# Patient Record
Sex: MALE | Race: WHITE | NOT HISPANIC OR LATINO | URBAN - METROPOLITAN AREA
[De-identification: names, ages, dates, MRNs, and addresses within clinical notes are randomized per-mention and may not be internally consistent; named-entity substitution may affect disease eponyms.]

---

## 2017-05-28 ENCOUNTER — INPATIENT (INPATIENT)
Facility: HOSPITAL | Age: 47
LOS: 0 days | Discharge: HOME | End: 2017-05-29
Payer: COMMERCIAL

## 2017-05-28 PROCEDURE — 72170 X-RAY EXAM OF PELVIS: CPT | Mod: 26

## 2017-05-28 PROCEDURE — 71010: CPT | Mod: 26

## 2017-05-28 PROCEDURE — 70496 CT ANGIOGRAPHY HEAD: CPT | Mod: 26

## 2017-05-28 PROCEDURE — 99285 EMERGENCY DEPT VISIT HI MDM: CPT

## 2017-05-28 PROCEDURE — 70498 CT ANGIOGRAPHY NECK: CPT | Mod: 26

## 2017-09-29 ENCOUNTER — OUTPATIENT (OUTPATIENT)
Dept: OUTPATIENT SERVICES | Facility: HOSPITAL | Age: 47
LOS: 1 days | End: 2017-09-29
Payer: COMMERCIAL

## 2017-09-29 PROCEDURE — 76700 US EXAM ABDOM COMPLETE: CPT | Mod: 26

## 2017-12-02 ENCOUNTER — EMERGENCY (EMERGENCY)
Facility: HOSPITAL | Age: 47
LOS: 1 days | End: 2017-12-02
Payer: COMMERCIAL

## 2017-12-02 PROCEDURE — 99284 EMERGENCY DEPT VISIT MOD MDM: CPT

## 2021-07-01 ENCOUNTER — TRANSCRIPTION ENCOUNTER (OUTPATIENT)
Age: 51
End: 2021-07-01

## 2021-07-02 ENCOUNTER — APPOINTMENT (OUTPATIENT)
Dept: ULTRASOUND IMAGING | Facility: CLINIC | Age: 51
End: 2021-07-02
Payer: COMMERCIAL

## 2021-07-02 PROCEDURE — 76870 US EXAM SCROTUM: CPT

## 2021-07-16 ENCOUNTER — APPOINTMENT (OUTPATIENT)
Dept: ULTRASOUND IMAGING | Facility: CLINIC | Age: 51
End: 2021-07-16
Payer: COMMERCIAL

## 2021-07-16 PROCEDURE — 93880 EXTRACRANIAL BILAT STUDY: CPT

## 2021-07-17 ENCOUNTER — TRANSCRIPTION ENCOUNTER (OUTPATIENT)
Age: 51
End: 2021-07-17

## 2021-08-04 PROBLEM — Z00.00 ENCOUNTER FOR PREVENTIVE HEALTH EXAMINATION: Status: ACTIVE | Noted: 2021-08-04

## 2021-08-05 ENCOUNTER — APPOINTMENT (OUTPATIENT)
Dept: ORTHOPEDIC SURGERY | Facility: CLINIC | Age: 51
End: 2021-08-05

## 2021-08-12 ENCOUNTER — NON-APPOINTMENT (OUTPATIENT)
Age: 51
End: 2021-08-12

## 2021-08-12 ENCOUNTER — APPOINTMENT (OUTPATIENT)
Dept: UROLOGY | Facility: CLINIC | Age: 51
End: 2021-08-12
Payer: COMMERCIAL

## 2021-08-12 VITALS
SYSTOLIC BLOOD PRESSURE: 131 MMHG | TEMPERATURE: 97.7 F | DIASTOLIC BLOOD PRESSURE: 87 MMHG | BODY MASS INDEX: 24.44 KG/M2 | WEIGHT: 165 LBS | HEIGHT: 69 IN | HEART RATE: 71 BPM

## 2021-08-12 DIAGNOSIS — Z78.9 OTHER SPECIFIED HEALTH STATUS: ICD-10-CM

## 2021-08-12 DIAGNOSIS — N50.819 TESTICULAR PAIN, UNSPECIFIED: ICD-10-CM

## 2021-08-12 PROCEDURE — 99203 OFFICE O/P NEW LOW 30 MIN: CPT

## 2021-08-12 NOTE — LETTER BODY
[Dear  ___] : Dear  [unfilled], [Consult Letter:] : I had the pleasure of evaluating your patient, [unfilled]. [Please see my note below.] : Please see my note below. [Consult Closing:] : Thank you very much for allowing me to participate in the care of this patient.  If you have any questions, please do not hesitate to contact me. [Sincerely,] : Sincerely, [FreeTextEntry3] : Supa Ho, DO\par Genitourinary Medicine\par

## 2021-08-12 NOTE — HISTORY OF PRESENT ILLNESS
[FreeTextEntry1] : Mr. SARABJIT CRAWFORD 50 year old  M  no PMH and no PSH. Pt comes in bc of left testicular pain since the beginning of July that comes and goes. Pain happens at least once a day. Pt was treated with doxycycline for 1 month. 7/2/21 US shows small bilat hydroceles and a small echogenic focus right testicle\par

## 2021-08-12 NOTE — ASSESSMENT
[FreeTextEntry1] : Plan\par NSAIDs for pain\par scrotal support\par scrotal US 6 months \par fu 6  months

## 2022-02-17 ENCOUNTER — APPOINTMENT (OUTPATIENT)
Dept: UROLOGY | Facility: CLINIC | Age: 52
End: 2022-02-17

## 2023-08-25 ENCOUNTER — NON-APPOINTMENT (OUTPATIENT)
Age: 53
End: 2023-08-25

## 2023-08-25 ENCOUNTER — APPOINTMENT (OUTPATIENT)
Dept: CARDIOLOGY | Facility: CLINIC | Age: 53
End: 2023-08-25
Payer: COMMERCIAL

## 2023-08-25 VITALS
OXYGEN SATURATION: 99 % | HEART RATE: 65 BPM | WEIGHT: 167 LBS | BODY MASS INDEX: 25.31 KG/M2 | HEIGHT: 68 IN | DIASTOLIC BLOOD PRESSURE: 60 MMHG | SYSTOLIC BLOOD PRESSURE: 96 MMHG

## 2023-08-25 VITALS — DIASTOLIC BLOOD PRESSURE: 60 MMHG | SYSTOLIC BLOOD PRESSURE: 104 MMHG

## 2023-08-25 DIAGNOSIS — Z82.49 FAMILY HISTORY OF ISCHEMIC HEART DISEASE AND OTHER DISEASES OF THE CIRCULATORY SYSTEM: ICD-10-CM

## 2023-08-25 DIAGNOSIS — Z78.9 OTHER SPECIFIED HEALTH STATUS: ICD-10-CM

## 2023-08-25 PROCEDURE — 99204 OFFICE O/P NEW MOD 45 MIN: CPT | Mod: 25

## 2023-08-25 PROCEDURE — 93000 ELECTROCARDIOGRAM COMPLETE: CPT

## 2023-08-25 RX ORDER — ATORVASTATIN CALCIUM 80 MG/1
TABLET, FILM COATED ORAL
Refills: 0 | Status: DISCONTINUED | COMMUNITY
End: 2023-08-25

## 2023-08-27 NOTE — ASSESSMENT
[FreeTextEntry1] : There is a family history of cardiovascular disease and dyslipidemia.   Abnl EKG Echo ETT Then CAC v CCTA Lipid lowering rx to drive down LDL

## 2023-08-27 NOTE — HISTORY OF PRESENT ILLNESS
[FreeTextEntry1] : SARABJIT CRAWFORD  is a 52 year old  M He presents today for initial cardiovascular evaluation.   He works as a local .   He takes atorvastatin 40 mg.  previously on 20 mg.   There is a longstanding history of dyslipidemia with low HDL.  His brother has coronary artery disease.   There is no prior history of a clinical myocardial infarction, coronary revascularization.  There is no history of symptomatic congestive heart failure rheumatic heart disease or valvular disease. There is no history of symptomatic arrhythmias including atrial fibrillation.  He is physically active.  He regularly rides his exercise bike cycle.  There is no exertional chest pain, pressure or discomfort.  There is no significant dyspnea on exertion or orthopnea.  There are no symptomatic palpitations, lightheadedness, dizziness or syncope.  Blood work August 2023 TSH 2.6 HDL 17  hemoglobin 13.7 creatinine 0.9 EKG demonstrates sinus rhythm with left atrial enlargement LVH with early repolarization.

## 2023-10-20 ENCOUNTER — APPOINTMENT (OUTPATIENT)
Dept: CARDIOLOGY | Facility: CLINIC | Age: 53
End: 2023-10-20
Payer: COMMERCIAL

## 2023-10-20 PROCEDURE — 93015 CV STRESS TEST SUPVJ I&R: CPT

## 2023-10-20 PROCEDURE — 93306 TTE W/DOPPLER COMPLETE: CPT

## 2023-10-27 ENCOUNTER — APPOINTMENT (OUTPATIENT)
Dept: CARDIOLOGY | Facility: CLINIC | Age: 53
End: 2023-10-27
Payer: COMMERCIAL

## 2023-10-27 VITALS
DIASTOLIC BLOOD PRESSURE: 60 MMHG | HEART RATE: 65 BPM | WEIGHT: 160 LBS | SYSTOLIC BLOOD PRESSURE: 96 MMHG | BODY MASS INDEX: 24.25 KG/M2 | OXYGEN SATURATION: 95 % | HEIGHT: 68 IN

## 2023-10-27 PROCEDURE — 99213 OFFICE O/P EST LOW 20 MIN: CPT

## 2023-10-27 RX ORDER — ROSUVASTATIN CALCIUM 20 MG/1
20 TABLET, FILM COATED ORAL DAILY
Refills: 0 | Status: ACTIVE | COMMUNITY

## 2023-10-27 RX ORDER — ATORVASTATIN CALCIUM 40 MG/1
40 TABLET, FILM COATED ORAL DAILY
Refills: 0 | Status: DISCONTINUED | COMMUNITY
End: 2023-10-27

## 2024-01-11 NOTE — ASSESSMENT
[FreeTextEntry1] : continue regular aerobic exercise program.  Focus on cardiovascular prevention.  Adjunctive lifestyle measures Recent cardiovascular testing has been reviewed.  Follow-up blood work with change in lipid-lowering therapy.  Discussed nonstatin lipid-lowering therapies if intolerance.  A coronary artery calcium score has been requested for further risk stratification.   There is a family history of cardiovascular disease and dyslipidemia. Abnl EKG Lipid lowering rx to drive down LDL.

## 2024-01-11 NOTE — HISTORY OF PRESENT ILLNESS
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 previously on 20 mg. There is a longstanding history of dyslipidemia with low HDL. His brother has coronary artery disease. There is no prior history of a clinical myocardial infarction, coronary revascularization. There is no history of symptomatic congestive heart failure rheumatic heart disease or valvular disease. There is no history of symptomatic arrhythmias including atrial fibrillation.  He is physically active. He regularly rides his exercise bike cycle. There is no exertional chest pain, pressure or discomfort. There is no significant dyspnea on exertion or orthopnea. There are no symptomatic palpitations, lightheadedness, dizziness or syncope.  In the interim he developed intolerance to atorvastatin described as muscle aches and weakness.  Now he is on rosuvastatin. There are no side effects.   Echocardiogram demonstrates normal left ventricular function with trace to mild valvular heart disease  exercise test stage V 15 minutes 98% of max predicted heart rate excellent exercise capacity no ischemic ST changes  Blood work August 2023 TSH 2.6 HDL 17  hemoglobin 13.7 creatinine 0.9 EKG demonstrates sinus rhythm with left atrial enlargement LVH with early repolarization.

## 2024-01-17 RX ORDER — ASPIRIN ENTERIC COATED TABLETS 81 MG 81 MG/1
81 TABLET, DELAYED RELEASE ORAL
Qty: 90 | Refills: 3 | Status: ACTIVE | COMMUNITY
Start: 2024-01-17

## 2024-01-17 RX ORDER — EZETIMIBE 10 MG/1
10 TABLET ORAL DAILY
Qty: 90 | Refills: 3 | Status: ACTIVE | COMMUNITY
Start: 2024-01-17

## 2024-02-02 ENCOUNTER — APPOINTMENT (OUTPATIENT)
Dept: CARDIOLOGY | Facility: CLINIC | Age: 54
End: 2024-02-02
Payer: COMMERCIAL

## 2024-02-02 VITALS
SYSTOLIC BLOOD PRESSURE: 120 MMHG | WEIGHT: 165 LBS | BODY MASS INDEX: 25.01 KG/M2 | HEIGHT: 68 IN | DIASTOLIC BLOOD PRESSURE: 82 MMHG | HEART RATE: 71 BPM | OXYGEN SATURATION: 100 %

## 2024-02-02 DIAGNOSIS — I25.10 ATHEROSCLEROTIC HEART DISEASE OF NATIVE CORONARY ARTERY W/OUT ANGINA PECTORIS: ICD-10-CM

## 2024-02-02 DIAGNOSIS — E78.2 MIXED HYPERLIPIDEMIA: ICD-10-CM

## 2024-02-02 DIAGNOSIS — I25.84 ATHEROSCLEROTIC HEART DISEASE OF NATIVE CORONARY ARTERY W/OUT ANGINA PECTORIS: ICD-10-CM

## 2024-02-02 DIAGNOSIS — R94.31 ABNORMAL ELECTROCARDIOGRAM [ECG] [EKG]: ICD-10-CM

## 2024-02-02 DIAGNOSIS — Z71.89 OTHER SPECIFIED COUNSELING: ICD-10-CM

## 2024-02-02 DIAGNOSIS — E78.6 LIPOPROTEIN DEFICIENCY: ICD-10-CM

## 2024-02-02 DIAGNOSIS — Z82.49 FAMILY HISTORY OF ISCHEMIC HEART DISEASE AND OTHER DISEASES OF THE CIRCULATORY SYSTEM: ICD-10-CM

## 2024-02-02 DIAGNOSIS — R07.89 OTHER CHEST PAIN: ICD-10-CM

## 2024-02-02 PROCEDURE — 99214 OFFICE O/P EST MOD 30 MIN: CPT

## 2024-02-02 NOTE — ASSESSMENT
[FreeTextEntry1] : SARABJIT CRAWFORD is a 53 year old M who presents today Feb 02, 2024 with the above history and the following active issues:   CAC ACP He has longstanding GERD sx difficult to differentiate from substernal CP. Obtain cardiac CTA to r/o obx CAD cont ASA + statin + zetia cont regular exercise call if symptoms occur Reviewed red flag symptoms which would warrant emergent medical evaluation.   HLD low HDL  FHx reviewed goal LDL will further adjust lipid lowering as indicated based on next labs add Lp(a)   Followup after testing   Sincerely,  MISSY Torre Patients history, testing, and plan reviewed with supervising MD: Dr. Bart Davis

## 2024-02-02 NOTE — HISTORY OF PRESENT ILLNESS
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 previously on 20 mg. There is a longstanding history of dyslipidemia with low HDL. His brother has coronary artery disease.  There is no prior history of a clinical myocardial infarction, coronary revascularization. There is no history of symptomatic congestive heart failure rheumatic heart disease or valvular disease. There is no history of symptomatic arrhythmias including atrial fibrillation.  He is physically active. He regularly rides his exercise bike cycle. There is no exertional chest pain, pressure or discomfort. There is no significant dyspnea on exertion or orthopnea. There are no symptomatic palpitations, lightheadedness, dizziness or syncope.  In the interim he developed intolerance to atorvastatin described as muscle aches and weakness.  Now takes crestor no issues.   He has longstanding GERD sx difficult to differentiate from substernal CP. Symptoms are intermittent and nonexertional.   Calcium score of 362. Has been on crestor 20mg and reports recently PCP added zetia for LDL in the 90s Echocardiogram demonstrates normal left ventricular function with trace to mild valvular heart disease  exercise test stage V 15 minutes 98% of max predicted heart rate excellent exercise capacity no ischemic ST changes  Blood work August 2023 TSH 2.6 HDL 17  hemoglobin 13.7 creatinine 0.9 EKG demonstrates sinus rhythm with left atrial enlargement LVH with early repolarization.

## 2024-03-01 ENCOUNTER — RESULT REVIEW (OUTPATIENT)
Age: 54
End: 2024-03-01

## 2024-03-08 ENCOUNTER — NON-APPOINTMENT (OUTPATIENT)
Age: 54
End: 2024-03-08

## 2024-04-05 ENCOUNTER — APPOINTMENT (OUTPATIENT)
Dept: ULTRASOUND IMAGING | Facility: CLINIC | Age: 54
End: 2024-04-05
Payer: COMMERCIAL

## 2024-04-05 PROCEDURE — 76705 ECHO EXAM OF ABDOMEN: CPT

## 2024-05-24 ENCOUNTER — RESULT REVIEW (OUTPATIENT)
Age: 54
End: 2024-05-24

## 2024-07-02 ENCOUNTER — APPOINTMENT (OUTPATIENT)
Dept: CT IMAGING | Facility: CLINIC | Age: 54
End: 2024-07-02
Payer: COMMERCIAL

## 2024-07-02 PROCEDURE — 71250 CT THORAX DX C-: CPT

## 2024-07-22 ENCOUNTER — APPOINTMENT (OUTPATIENT)
Dept: NEUROLOGY | Facility: CLINIC | Age: 54
End: 2024-07-22

## 2024-07-22 VITALS
SYSTOLIC BLOOD PRESSURE: 151 MMHG | DIASTOLIC BLOOD PRESSURE: 81 MMHG | WEIGHT: 165 LBS | BODY MASS INDEX: 25.01 KG/M2 | HEART RATE: 72 BPM | OXYGEN SATURATION: 98 % | HEIGHT: 68 IN

## 2024-07-22 DIAGNOSIS — R51.9 HEADACHE, UNSPECIFIED: ICD-10-CM

## 2024-07-22 PROCEDURE — 99204 OFFICE O/P NEW MOD 45 MIN: CPT

## 2024-07-22 NOTE — ASSESSMENT
[FreeTextEntry1] : 54 y/o RH man hx of HLD and migraine, p/w new onset migraine for over a month, over the left eye, can last hours  to the whole day, throbbing, moderate intensity, without any autonomic or migrainous features, no neurologic deficits during the headache. Recent MRI negative for mass or lesion. Tension headache vs probable migraine.    Imaging not indicated at this time. If there is a change in headache features or patient's condition then will re-consider imaging.   Acute: naproxen 500mg twice daily as needed. limit to three days a week.   Prevention: magnesium 400mg daily   Advised to keep headache journal to track headache frequency, triggers, and response to treatment.   Discussed common side effects of prescribed medications and potential alternatives.    Counseled patient on the importance of maintaining a healthy lifestyle, including balanced diet, adequate hydration, stress management, proper sleep hygiene, and physical activity.       Answered all questions and concerns to the best of my ability. Advised to call for any new or worsening symptoms.    In order to maintain continuity of care/prescription refills, patients must be seen on a yearly basis.   Total time spent on the day of the visit, including pre-visit and post-visit time was 45 minutes.    Follow up in 6 weeks with NP.

## 2024-07-22 NOTE — HISTORY OF PRESENT ILLNESS
[FreeTextEntry1] : CC: headache     Patient Referred by:     HPI: 52 y/o RH man hx of migraine headaches. Starting in June has pain always up and above the left eye, every day since it started.  Starts and stops vey quickly. No clear triggers.   will sometimes feel connected to the back of his neck, but occurred 3 times or so. So far no headache today but maybe feels one coming on.    HAs began: New headache in June, no inciting events, have been nearly daily since then.  Location: about the left eye Quality: throbbing Severity:  migraines are debilitating Disability: Duration: comes and goes can be hours or whole day Frequency: daily for past 4-5 weeks' migraines are couple times a year Temporal course: Time of day: Pain Free between Attacks: yes Triggers: unknown Relieving factors: Exacerbating factors: exercise and Valsalva maneuver do not make headache worse Prodrome or postdrome:   Aura: none    Ass'd Symptoms: Nausea + Vomiting Photophobia + Phonophobia: Osmophobia + with migraine brain fog/difficulty concentrating Irritability Allodynia - Blurred Vision Neck pain Dizziness   Autonomic features: Eye tearing Nose running Eye redness Foreign body sensation   Additional Social/Work History: Occupation:  Habits: Caffeine:  1 cup a day ETOH:   one drink a week maybe    Tobacco:       Drugs: 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  Preventive: Magnesium 400 mg daily.   Prior medications: naproxen, ibuprofen, prescribed sumatriptan-but unsure if he took it or helped.    Non-pharmacologic Tx:   Imaging: MRI brain 7/11/24- Brain: The ventricles, sulci, and cisterns are normal in caliber for patient's age without evidence for hydrocephalus. There are patchy areas of T2/FLAIR hyperintensity in the white matter. While this is nonspecific, it is compatible with microvascular changes. White matter changes are mildly increased from previous examination.. There is no evidence of intra-axial mass or abnormal enhancement.. The cranio-cervical junction is within normal limits. Diffusion     Labs: recent labwork all negative including tick borne illness negative.     FH: none

## 2024-07-22 NOTE — REASON FOR VISIT
[Initial Eval - Existing Diagnosis] : an initial evaluation of an existing diagnosis [FreeTextEntry1] : headaches

## 2024-07-22 NOTE — PHYSICAL EXAM
[FreeTextEntry1] :     GENERAL PHYSICAL EXAM: GEN: no acute distress, normal affect EYES:  sclera white, conjunctiva clear PULM: no respiratory distress, normal rate EXT: no edema, no cyanosis Cranial:  no supraorbital, temporal, or occipital region tenderness.  Neck: Normal ROM, bilateral trapezius muscle spasm, tenderness SKIN: warm, dry, no rash or lesion on exposed skin   NEUROLOGICAL EXAM: Mental Status Orientation: alert and oriented to person, place, time, and situation Language: clear and fluent, intact comprehension and repetition   Cranial Nerves II: visual fields full to confrontation III, IV, VI:  PERRL, EOMI, VFF, no nystagmus, no ptosis V, VII: facial sensation and movement intact and symmetric VIII: hearing intact to finger rub bilat IX, X: uvula midline, soft palate elevates normally XI: BL shoulder shrug intact, lateral head movement 5/5 bilat XII: tongue midline   Motor: Bilateral muscle strength 5/5 in UE and LE, proximally and distally, symmetric throughout Tone and bulk are normal in upper and lower limbs. No abnormal movements   Sensation: Intact to light touch  in all 4 EXTs,   Coordination: Normal FNF bilateral,   Reflex: 2+ in BL biceps, brachioradialis, triceps, patella, Achilles. Babinski absent bilat   Gait Normal stance, stride,

## 2024-07-23 ENCOUNTER — APPOINTMENT (OUTPATIENT)
Dept: MRI IMAGING | Facility: CLINIC | Age: 54
End: 2024-07-23

## 2024-09-13 ENCOUNTER — NON-APPOINTMENT (OUTPATIENT)
Age: 54
End: 2024-09-13

## 2024-09-13 ENCOUNTER — APPOINTMENT (OUTPATIENT)
Dept: CARDIOLOGY | Facility: CLINIC | Age: 54
End: 2024-09-13
Payer: COMMERCIAL

## 2024-09-13 VITALS
HEIGHT: 68 IN | DIASTOLIC BLOOD PRESSURE: 72 MMHG | HEART RATE: 82 BPM | OXYGEN SATURATION: 99 % | WEIGHT: 164 LBS | SYSTOLIC BLOOD PRESSURE: 122 MMHG | BODY MASS INDEX: 24.86 KG/M2

## 2024-09-13 DIAGNOSIS — I25.10 ATHEROSCLEROTIC HEART DISEASE OF NATIVE CORONARY ARTERY W/OUT ANGINA PECTORIS: ICD-10-CM

## 2024-09-13 DIAGNOSIS — Z82.49 FAMILY HISTORY OF ISCHEMIC HEART DISEASE AND OTHER DISEASES OF THE CIRCULATORY SYSTEM: ICD-10-CM

## 2024-09-13 DIAGNOSIS — E78.2 MIXED HYPERLIPIDEMIA: ICD-10-CM

## 2024-09-13 DIAGNOSIS — R79.89 OTHER SPECIFIED ABNORMAL FINDINGS OF BLOOD CHEMISTRY: ICD-10-CM

## 2024-09-13 DIAGNOSIS — Z71.89 OTHER SPECIFIED COUNSELING: ICD-10-CM

## 2024-09-13 PROCEDURE — 99214 OFFICE O/P EST MOD 30 MIN: CPT

## 2024-09-13 PROCEDURE — 93000 ELECTROCARDIOGRAM COMPLETE: CPT

## 2024-09-13 NOTE — HISTORY OF PRESENT ILLNESS
[FreeTextEntry1] : SARABJIT CRAWFORD  is a 53 year old  M Last seen February 2024 eye CTA was done March 2024 the calcium score was 353.  CT FFR was performed.  Left dominant circulation.  Distal left main calcified plaque without narrowing calcified is plaque in proximal LAD without narrowing ramus scattered calcification minimal narrowing first obtuse marginal scattered calcified plaque minimal narrowing moderate coronary artery calcification nonobstructive disease FFR unremarkable blood work March 2024 LP(a) within normal limits EKG demonstrates normal sinus rhythm with early repolarization blood work September 2024 hemoglobin 12.6 creatinine 0.9 total cholesterol 96 LDL 58 TSH 2.2  clinical improvement in cholesterol presently on combination therapy with rosuvastatin and Zetia  CAD Calcium score 300s. longstanding history of dyslipidemia with low HDL. brother has coronary artery disease. intolerance to atorvastatin described as muscle aches and weakness.  Now takes crestor no issues.   He is physically active. He regularly rides his exercise bike cycle. There is no exertional chest pain, pressure or discomfort. There is no significant dyspnea on exertion or orthopnea. There are no symptomatic palpitations, lightheadedness, dizziness or syncope.  Echocardiogram demonstrates normal left ventricular function with trace to mild valvular heart disease  exercise test stage V 15 minutes 98% of max predicted heart rate excellent exercise capacity no ischemic ST changes  Blood work August 2023 TSH 2.6 HDL 17  hemoglobin 13.7 creatinine 0.9  CAC HLD low HDL nl lpa FHxA reviewed goal LDL statin/zetia Reviewed red flag symptoms which would warrant emergent medical evaluation.

## 2025-04-11 ENCOUNTER — APPOINTMENT (OUTPATIENT)
Dept: CARDIOLOGY | Facility: CLINIC | Age: 55
End: 2025-04-11
Payer: COMMERCIAL

## 2025-04-11 VITALS
OXYGEN SATURATION: 98 % | BODY MASS INDEX: 24.55 KG/M2 | SYSTOLIC BLOOD PRESSURE: 140 MMHG | HEIGHT: 68 IN | HEART RATE: 65 BPM | DIASTOLIC BLOOD PRESSURE: 86 MMHG | WEIGHT: 162 LBS

## 2025-04-11 DIAGNOSIS — I25.10 ATHEROSCLEROTIC HEART DISEASE OF NATIVE CORONARY ARTERY W/OUT ANGINA PECTORIS: ICD-10-CM

## 2025-04-11 DIAGNOSIS — E78.2 MIXED HYPERLIPIDEMIA: ICD-10-CM

## 2025-04-11 DIAGNOSIS — Z82.49 FAMILY HISTORY OF ISCHEMIC HEART DISEASE AND OTHER DISEASES OF THE CIRCULATORY SYSTEM: ICD-10-CM

## 2025-04-11 DIAGNOSIS — Z71.89 OTHER SPECIFIED COUNSELING: ICD-10-CM

## 2025-04-11 PROCEDURE — 99214 OFFICE O/P EST MOD 30 MIN: CPT
